# Patient Record
Sex: MALE | Employment: PART TIME
[De-identification: names, ages, dates, MRNs, and addresses within clinical notes are randomized per-mention and may not be internally consistent; named-entity substitution may affect disease eponyms.]

---

## 2021-09-03 ENCOUNTER — TRANSCRIBE ORDERS (OUTPATIENT)
Dept: OTHER | Age: 36
End: 2021-09-03

## 2021-09-03 DIAGNOSIS — Z31.69 ENCOUNTER FOR OTHER GENERAL COUNSELING AND ADVICE ON PROCREATION: Primary | ICD-10-CM

## 2022-09-02 DIAGNOSIS — N46.9 MALE INFERTILITY: Primary | ICD-10-CM

## 2022-10-14 ENCOUNTER — TELEPHONE (OUTPATIENT)
Dept: UROLOGY | Facility: CLINIC | Age: 37
End: 2022-10-14

## 2022-10-14 NOTE — TELEPHONE ENCOUNTER
Spoke with , patient's wife, and patient. Informed them that patient is missing a semen analysis lab. Patient's wife mentioned that patient completed the lab at the [facility], and they have a physical copy of the records that they will bring to the visit on 11/9/22 with Dr. Hahn. Patient is also scheduled for a blood draw lab on 10/19/22 at the [facility]. Nothing further is needed at this time.     Ira Mancera on 10/14/2022 at 11:17 AM

## 2022-10-19 ENCOUNTER — LAB (OUTPATIENT)
Dept: LAB | Facility: CLINIC | Age: 37
End: 2022-10-19
Payer: COMMERCIAL

## 2022-10-19 DIAGNOSIS — N46.9 MALE INFERTILITY: ICD-10-CM

## 2022-10-19 LAB
FSH SERPL IRP2-ACNC: 2.4 MIU/ML (ref 1.5–12.4)
SHBG SERPL-SCNC: 28 NMOL/L (ref 11–80)

## 2022-10-19 PROCEDURE — 36415 COLL VENOUS BLD VENIPUNCTURE: CPT

## 2022-10-19 PROCEDURE — 83001 ASSAY OF GONADOTROPIN (FSH): CPT

## 2022-10-19 PROCEDURE — 84270 ASSAY OF SEX HORMONE GLOBUL: CPT

## 2022-10-19 PROCEDURE — 84403 ASSAY OF TOTAL TESTOSTERONE: CPT

## 2022-10-19 PROCEDURE — 82670 ASSAY OF TOTAL ESTRADIOL: CPT

## 2022-10-19 NOTE — LETTER
October 28, 2022      Mary Anne Isabel  [id number]   [place] [address]      Dear ,    We are writing to inform you of your test results.  Normal results    Resulted Orders   Estradiol ultrasensitive   Result Value Ref Range    Estradiol Ultrasensitive 24 10 - 40 pg/mL      Comment:      Estradiol Male Reference Range  Prepubertal Males:  0-13 pg/mL  Adult Males:  10-40 pg/mL      Narrative    This test was developed and its performance characteristics determined by the [facility],  Special [facility]. It has not been cleared or approved by the FDA. The laboratory is regulated under CLIA as qualified to perform high-complexity testing. This test is used for clinical purposes. It should not be regarded as investigational or for research.   Follicle stimulating hormone   Result Value Ref Range    FSH 2.4 1.5 - 12.4 mIU/mL   Sex Hormone Binding Globulin   Result Value Ref Range    Sex Hormone Binding Globulin 28 11 - 80 nmol/L   Testosterone Free and Total   Result Value Ref Range    Free Testosterone Calculated 10.36 ng/dL      Comment:      Male Ramy Ranges:  Ramy Stage I: Less than or equal to 0.37 ng/dL  Ramy Stage II: 0.03-2.1 ng/dL  Ramy Stage III: 0.10-9.8 ng/dL  Ramy Stage IV: 3.5-16.9 ng/dL  Ramy Stage V: 4.1-23.9 ng/dL    Testosterone Total 456 240 - 950 ng/dL    Narrative    This test was developed and its performance characteristics determined by the [facility],  [facility]. It has not been cleared or approved by the FDA. The laboratory is regulated under CLIA as qualified to perform high-complexity testing. This test is used for clinical purposes. It should not be regarded as investigational or for research.       If you have any questions or concerns, please call the clinic at the number listed above.       Sincerely,      Sandeep Hahn MD

## 2022-10-21 LAB
TESTOST FREE SERPL-MCNC: 10.36 NG/DL
TESTOST SERPL-MCNC: 456 NG/DL (ref 240–950)

## 2022-10-25 LAB — ESTRADIOL SERPL HS-MCNC: 24 PG/ML (ref 10–40)

## 2022-11-09 ENCOUNTER — OFFICE VISIT (OUTPATIENT)
Dept: UROLOGY | Facility: CLINIC | Age: 37
End: 2022-11-09
Payer: COMMERCIAL

## 2022-11-09 DIAGNOSIS — N46.11 OLIGOASTHENOTERATOSPERMIA: Primary | ICD-10-CM

## 2022-11-09 PROCEDURE — 99203 OFFICE O/P NEW LOW 30 MIN: CPT | Performed by: UROLOGY

## 2022-11-09 NOTE — LETTER
There are 3 main clinics in the [facility] that do all aspects of advanced female infertility care:     1. [facility] [place].  [email or web address].  Office in [place].     2. [facility] [email or web address]  Offices in [facility].        3. [email or web address]  Office in [place] and [place].

## 2022-11-09 NOTE — PROGRESS NOTES
Dear Heidi Garza, it was my pleasure to see Mr. Mary Anne Isabel, a 37 year old male here in consultation today for fertility evaluation.  His spouse is Carlos age 35 (4/1/87).    This couple has been attempting to conceive for the last 8yrs. They have no previous pregnancy together.  Pregnancies with other partners: none.  They have tried timed intercourse. They have not tried IUI or IVF.    Female factors suspected: None known.  Cycles are regular.  Here workup has been normal, polyp removed last month.    PAST MEDICAL HISTORY:    No chronic medical problems     PAST SURG HISTORY  He had varicocele surgery 8 years ago, he thinks maybe right side only.    Medications as of 11/9/2022:  No prescription medications       ALLERGY:   No Known Allergies    SOCIAL HISTORY:  Occupation: [employer].  No alcohol abuse, No tobacco use.   Does use cigarettes.  No marijuana  No alcohol.      GONADOTOXIN EXPOSURE: + for tobacco- advised abstain. Otherwise negative for marijuana, heat, chemicals, pesticides, heavy metals, steroids, chemotherapy or radiation.    GENERAL PHYSICAL EXAM  Constitutional: No acute distress. Well nourished.   PSYCH: normal mood and affect.  NEURO: normal gait, no focal deficits.   EYES: anicteric, EOMI, PERR  ENT: neck supple,  mucosae moist, no thrush.  CARDIOPULMONARY: breathing non-labored, pulse regular, no peripheral edema.  GI: Abdomen soft, non-tender, left high inguinal surgical scar, consistent with varicocele repair, no organomegaly.  MUSCULOSKELETAL: normal limb proportions, no muscle wasting, no contractures.  SKIN: Normal virilized hair distribution, no lesions, warts or rashes over genitalia, abdomen extremities or face.  HEME/LYMPH: no ecchymosis, no lymphadenopathy in groin or neck, no lymphedema.     EXAM:  Phallus circumcised, meatus adequate, no plaques palpated.   Left testis descended , size is 16 cc , consistency is normal . No intra-testicular masses.   Right testis  descended , size is 16 cc , consistency is normal . No intra-testicular masses.   Epididymes present, non-tender, not enlarged.   Left cord: Vas present. No varicocele noted.  Right cord: Vas present. No varicocele noted.     Rectal exam deferred.     Labs/imaging reviewed by me today:  Component      Latest Ref Rng & Units 10/19/2022   Free Testosterone Calculated      ng/dL 10.36   Testosterone Total      240 - 950 ng/dL 456   Estradiol Ultrasensitive      10 - 40 pg/mL 24   FSH      1.5 - 12.4 mIU/mL 2.4   Sex Hormone Binding Globulin      11 - 80 nmol/L 28     Semen Analysis [facility]:  2.3ml,  pH 6.8, 0.4M/cc, 2% motile, 0% morphology (>15%), Total Progressive Motile Count: very low, <1 Million.       He reports semen analysis results were actually higher before the varicocele repair.      ASSESSMENT:    Fertility Testing.    Testicular hypofunction - severe oligo-asthenoteratoospermia.  The etiology of severe oligospermia is not clear.    History of left varicocele repair, no persisting varicocele on physical exam today.      PLAN:    Hormonal panel reviewed, within normal limits.  No endocrine cause of oligospermia.    Semen analysis reviewed.  Severe oligo-asthenoteratoospermia.    Advised abstain tobacco.    Recommended IVF as the best option for future fertility.    Recommended he consider genetic screen with karyotype and Y-chromosome microdeletion testing recommended, based on sperm concentration <5M/mL.  He will think about this.  95% likely normal results.    The reasons for genetic testing discussed today are:    1. Look for a cause of low sperm count    2. Look for genetic problems that could be passed to a child through IVF treatments.    3. Look for possible future health problems to watch for in his own health.     PRN follow-up with urology.     List of IVF centers given.      Thank-you for allowing me to care for your patient.  Sincerely,    Sandeep Hahn MD      CC: No ref. provider found        Additional Coding Information:    Problems:  3 -- one stable chronic illness    Data Reviewed   semen analysis x1, 3 blood labs.    Tests ordered/pending: N/A     Notes from other providers reviewed: N/A     Level of risk:  3 -- low risk (e.g., OTC medication or observation, minor surgery without risks)    Time spent:  28 minutes spent on the date of the encounter doing chart review, history and exam, documentation and further activities per the note.  Vietnamese  used over phone.

## 2022-11-09 NOTE — NURSING NOTE
Mary Anne Isabel's goals for this visit include:   Chief Complaint   Patient presents with     Infertililty       He requests these members of his care team be copied on today's visit information: N/A    PCP: Heidi Latham    Referring Provider:  No referring provider defined for this encounter.    There were no vitals taken for this visit.    Do you need any medication refills at today's visit? N/A